# Patient Record
(demographics unavailable — no encounter records)

---

## 2025-01-07 NOTE — HISTORY OF PRESENT ILLNESS
[de-identified] : left eye discharge [FreeTextEntry6] : There has been a few days of low grade fever. No irritability or lethargy. This has been associated with a runny nose and cough, although not been severely disruptive to sleep or activities. There has been only mild decrease in oral intake, there are minimal GI symptoms and no signs of dehydration.  L eye dc today no photophobia

## 2025-01-07 NOTE — PHYSICAL EXAM
[Discharge] : discharge [Eyelid Swelling] : no eyelid swelling [Conjunctival Injection] : conjunctival injection [Mucoid Discharge] : mucoid discharge [Transmitted Upper Airway Sounds] : transmitted upper airway sounds [NL] : warm, clear [FreeTextEntry5] : Left Only  [FreeTextEntry3] : Impacted Cerumen removal with curette, LEFT ear

## 2025-01-07 NOTE — DISCUSSION/SUMMARY
[FreeTextEntry1] : Symptoms likely due to viral URI.  Children can get 6-10 colds per year and they are often clustered during the fall and winter.  Generally if the cough is keeping the child up more than 2 nights in a row in a significant way, that could be 1 concerning reason to consider returning.  Otherwise shortness of breath, lethargy, or irritability that is highly disruptive to sleep could be warning signs that would warrant evaluation as well.  Additionally, fevers that are trending higher after 3 days may be a sign of a complication that warrants evaluation.   If fevers occur, they tend to be at their highest on day one or two of fever, then trend lower.  It is not necessary to treat fevers for the sake of lowering the body temperature.  Treating fevers does not make children safer and does not lower the risk of a febrile seizure (a seizure associated with fever).  Febrile seizures are uncommon, and when they occur do not hurt the child.  But they can be upsetting understandably so to the parents.  However, children that do have an underlying seizure disorder may benefit from treating the fevers.  Fevers do not necessarily respond to fever medication; and if they do not it is not necessarily a bad sign. Patients may appear more ill when the fever is trending higher, but should be acting somewhat better when the fever is down. When fevers are present they typically tend to come a and go a few times each day, and tend to be worse at night.    Give supportive care including treatment for discomfort.  Follow up as needed for fever trend, new, or worsening symptoms.  Provide more frequent fluids and food as the intake is often in smaller more frequent amounts.   Consider nasal saline, suction only if it provides comfort or easier breathing. Follow up as needed for fever trend, new, or worsening symptoms.   Reviewed benefits and limitations of testing.  [email or web address].org for reference: Tools and Tips and link to symptom .    For [employer]  Rx and expectant care. Follow up as need for fever trend, new, or worsening symptoms.

## 2025-07-25 NOTE — HISTORY OF PRESENT ILLNESS
[de-identified] : Pink Eye [FreeTextEntry6] : FRANCO HOWARD is a 17 month old male presenting for complaints of pink eye.   Had URI last week, no fevers.  Drinking well.  Also wants to catch up on shots today.

## 2025-07-25 NOTE — HISTORY OF PRESENT ILLNESS
[de-identified] : Pink Eye [FreeTextEntry6] : FRANCO HOWARD is a 17 month old male presenting for complaints of pink eye.   Had URI last week, no fevers.  Drinking well.  Also wants to catch up on shots today.

## 2025-07-25 NOTE — PHYSICAL EXAM
[Acute Distress] : no acute distress [Conjuctival Injection] : conjunctival injection [Left] : (left) [Erythema] : erythema [Bulging] : bulging [Clear Rhinorrhea] : clear rhinorrhea [NL] : soft, nontender, nondistended, normal bowel sounds, no hepatosplenomegaly [No Abnormal Lymph Nodes Palpated] : no abnormal lymph nodes palpated [Warm] : warm

## 2025-07-25 NOTE — DISCUSSION/SUMMARY
[FreeTextEntry1] : 17 mo here with pink eye and left AOM. Shots given today, parental request as he does not have a fever and is well appearing.  Apply antibiotic drops as prescribed. Change pillowcases. Ensure good hand hygiene. Gently wipe away excess mucus with a warm washcloth or paper towel. Child can return to school after 24 hours of antibiotic treatment.  Return for worsening symptoms or failure to improve. Patient has been diagnosed with acute otitis media.  If prescribed, complete course of antibiotics.  Supportive measures including Tylenol and Ibuprofen can be used as needed for pain or fever. If patient fails to improve within the next 1-3 days parent/patient understands to follow up.  Complete antibiotic course. Potential side effect of antibiotics includes but not limited to diarrhea. Provide ibuprofen as needed for pain or fever. If no improvement within 48 hours return for re-evaluation.  Untreated ear infections may lead to: Permanent hearing loss Problems with speech and language development Spread of infection to neighboring tissues and organs, such as mastoiditis or meningitis  Treat as directed and follow up as needed for fever trend, new, or worsening symptoms. Call or return if rash or significant vomiting develops after starting medication. Some side effects of medication that may not need special treatments include loose BMs.